# Patient Record
Sex: FEMALE | Race: WHITE | NOT HISPANIC OR LATINO | URBAN - METROPOLITAN AREA
[De-identification: names, ages, dates, MRNs, and addresses within clinical notes are randomized per-mention and may not be internally consistent; named-entity substitution may affect disease eponyms.]

---

## 2022-12-17 ENCOUNTER — HOSPITAL ENCOUNTER (EMERGENCY)
Facility: OTHER | Age: 87
Discharge: HOME | End: 2022-12-17
Attending: EMERGENCY MEDICINE
Payer: MEDICARE

## 2022-12-17 VITALS
RESPIRATION RATE: 17 BRPM | HEIGHT: 63 IN | HEART RATE: 67 BPM | TEMPERATURE: 98 F | DIASTOLIC BLOOD PRESSURE: 59 MMHG | WEIGHT: 140 LBS | BODY MASS INDEX: 24.8 KG/M2 | SYSTOLIC BLOOD PRESSURE: 117 MMHG | OXYGEN SATURATION: 96 %

## 2022-12-17 DIAGNOSIS — S02.2XXA CLOSED FRACTURE OF NASAL BONE, INITIAL ENCOUNTER: Primary | ICD-10-CM

## 2022-12-17 DIAGNOSIS — S09.90XA INJURY OF HEAD, INITIAL ENCOUNTER: ICD-10-CM

## 2022-12-17 PROCEDURE — 99284 EMERGENCY DEPT VISIT MOD MDM: CPT | Mod: 25

## 2022-12-17 PROCEDURE — 25000003 PHARM REV CODE 250: Performed by: EMERGENCY MEDICINE

## 2022-12-17 RX ORDER — LANOLIN ALCOHOL/MO/W.PET/CERES
100 CREAM (GRAM) TOPICAL DAILY
COMMUNITY

## 2022-12-17 RX ORDER — OXYMETAZOLINE HCL 0.05 %
1 SPRAY, NON-AEROSOL (ML) NASAL 2 TIMES DAILY
Qty: 15 ML | Refills: 0 | Status: SHIPPED | OUTPATIENT
Start: 2022-12-17 | End: 2022-12-20

## 2022-12-17 RX ORDER — NAPROXEN SODIUM 220 MG/1
81 TABLET, FILM COATED ORAL DAILY
COMMUNITY

## 2022-12-17 RX ORDER — GABAPENTIN 400 MG/1
400 CAPSULE ORAL 3 TIMES DAILY
COMMUNITY

## 2022-12-17 RX ORDER — MUPIROCIN 20 MG/G
1 OINTMENT TOPICAL
Status: COMPLETED | OUTPATIENT
Start: 2022-12-17 | End: 2022-12-17

## 2022-12-17 RX ORDER — AMOXICILLIN AND CLAVULANATE POTASSIUM 875; 125 MG/1; MG/1
1 TABLET, FILM COATED ORAL 2 TIMES DAILY
Qty: 10 TABLET | Refills: 0 | Status: SHIPPED | OUTPATIENT
Start: 2022-12-17

## 2022-12-17 RX ORDER — PIOGLITAZONEHYDROCHLORIDE 15 MG/1
15 TABLET ORAL DAILY
COMMUNITY

## 2022-12-17 RX ORDER — CITALOPRAM 20 MG/1
20 TABLET, FILM COATED ORAL DAILY
COMMUNITY

## 2022-12-17 RX ORDER — OLMESARTAN MEDOXOMIL AND HYDROCHLOROTHIAZIDE 40/12.5 40; 12.5 MG/1; MG/1
1 TABLET ORAL DAILY
COMMUNITY

## 2022-12-17 RX ADMIN — MUPIROCIN 22 G: 20 OINTMENT TOPICAL at 05:12

## 2022-12-17 NOTE — ED NOTES
Pt demonstrated even and steady gait with ambulation through ED hallway and back to ED room, NAD noted, minimal assistance needed from RN. ED MD made aware of pt's ambulation trial.

## 2022-12-17 NOTE — DISCHARGE INSTRUCTIONS
We have prescribed you antibiotics. Please fill and take as directed. It is important that you completely finish the antibiotics.      Refrain from blowing your nose and sleep with the head of bed elevated.      Please return to the ER if you have chest pain, difficulty breathing, fevers, altered mental status, dizziness, weakness, or any other concerns.      Follow up with your primary care physician.

## 2022-12-17 NOTE — ED PROVIDER NOTES
Encounter Date: 12/17/2022       History     Chief Complaint   Patient presents with    Fall     C/o face injury, right knee injury and left hand pain s/p trip and fall today. Stated ASA 81 mg daily. -LOC. ABR noted fore head, ABR to nose, dry blood noted to bilateral nostril, ABR noted to right knee. And bruising to left hand. VSS      Seen by physician at 4:02PM:    Patient is an 89-year-old female who presents to the emergency room after a trip and fall.  Patient was in the sidewalk next to [facility] when she tripped and fell on her face.  She denies any LOC.  She is on aspirin but denies any blood thinner use.  She did have a resultant bloody nose along with abrasions to her face.  Denies any neck or back pain.  She otherwise feels well.  She denies any misalignment of her teeth.  She denies any blurry vision or changes in her vision.  Her last tetanus was approximately 1 year ago.    Review of patient's allergies indicates:  No Known Allergies  No past medical history on file.  No past surgical history on file.  No family history on file.     Review of Systems   Constitutional:  Negative for chills and fever.   HENT:  Negative for congestion and rhinorrhea.    Respiratory:  Negative for chest tightness and shortness of breath.    Cardiovascular:  Negative for chest pain and palpitations.   Gastrointestinal:  Negative for abdominal pain, diarrhea, nausea and vomiting.   Genitourinary:  Negative for dysuria and flank pain.   Musculoskeletal:  Negative for back pain and neck pain.   Skin:  Positive for wound. Negative for color change.   Neurological:  Negative for dizziness and headaches.     Physical Exam     Initial Vitals [12/17/22 1549]   BP Pulse Resp Temp SpO2   (!) 168/73 76 17 97.8 °F (36.6 °C) 96 %      MAP       --         Physical Exam    Nursing note and vitals reviewed.  Constitutional: She appears well-developed and well-nourished.   HENT:   Head: Normocephalic.   Hematoma noted to the forehead.  Dry  blood in the nares with no septal hematoma.   Eyes: Conjunctivae are normal.   Neck: Neck supple.   Normal range of motion.  Cardiovascular:  Normal rate, regular rhythm and normal heart sounds.           Pulmonary/Chest: Breath sounds normal. No respiratory distress. She has no wheezes. She has no rales.   Abdominal: Abdomen is soft. Bowel sounds are normal. She exhibits no distension. There is no abdominal tenderness. There is no rebound.   Musculoskeletal:         General: Normal range of motion.      Cervical back: Normal range of motion and neck supple.      Comments: No CTL midline tenderness     Neurological: She is alert and oriented to person, place, and time.   Skin: Skin is warm and dry. Capillary refill takes less than 2 seconds.   Abrasion to right knee.  Abrasion to forehead.  Abrasion to nose.       ED Course   Procedures  Labs Reviewed - No data to display       Imaging Results              CT Cervical Spine Without Contrast (Final result)  Result time 12/17/22 17:10:16      Final result by Jer Escobar MD (12/17/22 17:10:16)                   Impression:      No evidence of acute fracture or traumatic malalignment of the cervical spine.    Multilevel degenerative changes in the cervical spine.      Electronically signed by: Jer Escobar MD  Date:    12/17/2022  Time:    17:10               Narrative:    EXAMINATION:  CT CERVICAL SPINE WITHOUT CONTRAST    CLINICAL HISTORY:  Neck trauma (Age >= 65y);    TECHNIQUE:  Low dose axial images, sagittal and coronal reformations were performed though the cervical spine.  Contrast was not administered.    COMPARISON:  None    FINDINGS:  There is expected volume loss in the visualized portions of the posterior fossa.  The craniocervical junction is intact.  The predental space is maintained.  No prevertebral soft tissue swelling is identified.    The cervical alignment is unremarkable.  The vertebral body heights are maintained.  The C1 ring is intact.  There  is hypertrophy of the posterior elements.  There is intervertebral disc space narrowing at multiple levels.  There is variable spinal canal neural foraminal narrowing.  There is no evidence acute fracture or listhesis of the cervical spine.    The soft tissue the neck are unremarkable.  There is no evidence of lymphadenopathy in the neck.  The visualized lung apices are unremarkable.                                       CT Head Without Contrast (Final result)  Result time 12/17/22 17:05:24      Final result by Dominick Kimble MD (12/17/22 17:05:24)                   Impression:      1. Allowing for motion artifact, no convincing acute intracranial abnormalities noting sequela of chronic microvascular ischemic change and senescent change.  2. Nasal bone fracture, the entirety of the nasal bone is not included on the images provided, correlation is advised.      Electronically signed by: Dominick Kimble MD  Date:    12/17/2022  Time:    17:05               Narrative:    EXAMINATION:  CT HEAD WITHOUT CONTRAST    CLINICAL HISTORY:  Head trauma, minor (Age >= 65y);    TECHNIQUE:  Low dose axial images were obtained through the head.  Coronal and sagittal reformations were also performed. Contrast was not administered.    COMPARISON:  None.    FINDINGS:  There is motion artifact.    There is generalized cerebral volume loss.  There is hypoattenuation in a periventricular fashion, likely sequela of chronic microvascular ischemic change.  There is no evidence of acute major vascular territory infarct, hemorrhage, or mass.  There is no hydrocephalus.  There are no abnormal extra-axial fluid collections.  The paranasal sinuses and mastoid air cells are clear, and there is no evidence of calvarial fracture.  The visualized soft tissues are remarkable for subcutaneous emphysema involving the nasal soft tissues.  There is fracture of the right aspect of the nasal bone, possibly additional fracture on the left, the entirety  of the nasal bone is not included on the images provided..                                       Medications   mupirocin 2 % ointment 22 g (22 g Topical (Top) Given 12/17/22 1758)     Medical Decision Making:   History:   Old Medical Records: I decided to obtain old medical records.  Old Records Summarized: other records.  Initial Assessment:   4:02PM:  Patient is an 89-year-old female who presents to the emergency department after a fall.  She appears well, nontoxic.  She is neurologically intact.  Will plan for imaging, irrigation of wounds, will continue to follow and reassess.  Clinical Tests:   Radiological Study: Ordered and Reviewed     5:41 PM:  Patient doing well, remained stable.  She was able to ambulate with a steady gait and she felt well doing so.  Her CT is negative for any intracranial findings though she does evidence of a nasal fracture.  Will plan to prescribe antibiotics to take as needed along with Afrin and have her follow up with an ENT back at home which is in [place].  I do not feel that further work up in the ED is indicated at this time.  I updated pt regarding results and I counseled pt regarding supportive care measures.  I have discussed with the pt ED return warnings and need for close PCP f/u.  Pt agreeable to plan and all questions answered.  I feel that pt is stable for discharge and management as an outpatient and no further intervention is needed at this time.  Pt is comfortable returning to the ED if needed.  Will DC home in stable condition.                         Clinical Impression:   Final diagnoses:  [S02.2XXA] Closed fracture of nasal bone, initial encounter (Primary)  [S09.90XA] Injury of head, initial encounter        ED Disposition Condition    Discharge Stable          ED Prescriptions       Medication Sig Dispense Start Date End Date Auth. Provider    amoxicillin-clavulanate 875-125mg (AUGMENTIN) 875-125 mg per tablet Take 1 tablet by mouth 2 (two) times daily. 10  tablet 12/17/2022 -- Keturah Grullon MD    oxymetazoline (AFRIN) 0.05 % nasal spray 1 spray by Nasal route 2 (two) times daily. for 3 days 15 mL 12/17/2022 12/20/2022 Keturah Grullon MD          Follow-up Information       Follow up With Specialties Details Why Contact Info    Primary Care Physician                 Keturah Grullon MD  12/17/22 [id number]